# Patient Record
Sex: MALE | Race: WHITE | NOT HISPANIC OR LATINO | URBAN - METROPOLITAN AREA
[De-identification: names, ages, dates, MRNs, and addresses within clinical notes are randomized per-mention and may not be internally consistent; named-entity substitution may affect disease eponyms.]

---

## 2018-05-24 ENCOUNTER — INPATIENT (INPATIENT)
Facility: HOSPITAL | Age: 83
LOS: 3 days | Discharge: HOME | DRG: 866 | End: 2018-05-28
Attending: INTERNAL MEDICINE | Admitting: INTERNAL MEDICINE
Payer: MEDICARE

## 2018-05-24 VITALS — WEIGHT: 121.92 LBS

## 2018-05-24 DIAGNOSIS — D69.6 THROMBOCYTOPENIA, UNSPECIFIED: ICD-10-CM

## 2018-05-24 DIAGNOSIS — I48.91 UNSPECIFIED ATRIAL FIBRILLATION: ICD-10-CM

## 2018-05-24 DIAGNOSIS — R53.1 WEAKNESS: ICD-10-CM

## 2018-05-24 DIAGNOSIS — Z29.9 ENCOUNTER FOR PROPHYLACTIC MEASURES, UNSPECIFIED: ICD-10-CM

## 2018-05-24 DIAGNOSIS — I10 ESSENTIAL (PRIMARY) HYPERTENSION: ICD-10-CM

## 2018-05-24 DIAGNOSIS — N17.9 ACUTE KIDNEY FAILURE, UNSPECIFIED: ICD-10-CM

## 2018-05-24 DIAGNOSIS — E87.1 HYPO-OSMOLALITY AND HYPONATREMIA: ICD-10-CM

## 2018-05-24 DIAGNOSIS — R94.6 ABNORMAL RESULTS OF THYROID FUNCTION STUDIES: ICD-10-CM

## 2018-05-24 LAB
ALBUMIN SERPL ELPH-MCNC: 3.4 G/DL — SIGNIFICANT CHANGE UP (ref 3.3–5)
ALP SERPL-CCNC: 77 U/L — SIGNIFICANT CHANGE UP (ref 40–120)
ALT FLD-CCNC: 11 U/L — SIGNIFICANT CHANGE UP (ref 10–45)
ANION GAP SERPL CALC-SCNC: 11 MMOL/L — SIGNIFICANT CHANGE UP (ref 5–17)
APAP SERPL-MCNC: <15 UG/ML — SIGNIFICANT CHANGE UP (ref 10–30)
APPEARANCE UR: CLEAR — SIGNIFICANT CHANGE UP
APTT BLD: 29.7 SEC — SIGNIFICANT CHANGE UP (ref 27.5–37.4)
APTT BLD: 40.5 SEC — HIGH (ref 27.5–37.4)
APTT BLD: 78.8 SEC — HIGH (ref 27.5–37.4)
AST SERPL-CCNC: 19 U/L — SIGNIFICANT CHANGE UP (ref 10–40)
BASOPHILS NFR BLD AUTO: 0.3 % — SIGNIFICANT CHANGE UP (ref 0–2)
BILIRUB SERPL-MCNC: 0.5 MG/DL — SIGNIFICANT CHANGE UP (ref 0.2–1.2)
BILIRUB UR-MCNC: NEGATIVE — SIGNIFICANT CHANGE UP
BLD GP AB SCN SERPL QL: NEGATIVE — SIGNIFICANT CHANGE UP
BUN SERPL-MCNC: 26 MG/DL — HIGH (ref 7–23)
CALCIUM SERPL-MCNC: 8.7 MG/DL — SIGNIFICANT CHANGE UP (ref 8.4–10.5)
CHLORIDE SERPL-SCNC: 95 MMOL/L — LOW (ref 96–108)
CHOLEST SERPL-MCNC: 134 MG/DL — SIGNIFICANT CHANGE UP (ref 10–199)
CK MB CFR SERPL CALC: 1.3 NG/ML — SIGNIFICANT CHANGE UP (ref 0–6.7)
CK MB CFR SERPL CALC: 1.5 NG/ML — SIGNIFICANT CHANGE UP (ref 0–6.7)
CK MB CFR SERPL CALC: 1.6 NG/ML — SIGNIFICANT CHANGE UP (ref 0–6.7)
CK SERPL-CCNC: 101 U/L — SIGNIFICANT CHANGE UP (ref 30–200)
CK SERPL-CCNC: 109 U/L — SIGNIFICANT CHANGE UP (ref 30–200)
CK SERPL-CCNC: 96 U/L — SIGNIFICANT CHANGE UP (ref 30–200)
CLOSURE TME COLL+EPINEP BLD: 108 K/UL — LOW (ref 150–400)
CO2 SERPL-SCNC: 25 MMOL/L — SIGNIFICANT CHANGE UP (ref 22–31)
COLOR SPEC: YELLOW — SIGNIFICANT CHANGE UP
CREAT ?TM UR-MCNC: 17 MG/DL — SIGNIFICANT CHANGE UP
CREAT SERPL-MCNC: 1.3 MG/DL — SIGNIFICANT CHANGE UP (ref 0.5–1.3)
DIFF PNL FLD: (no result)
EOSINOPHIL NFR BLD AUTO: 0.2 % — SIGNIFICANT CHANGE UP (ref 0–6)
EOSINOPHIL NFR BLD AUTO: 1 % — SIGNIFICANT CHANGE UP (ref 0–6)
ETHANOL SERPL-MCNC: <10 MG/DL — SIGNIFICANT CHANGE UP (ref 0–10)
GLUCOSE SERPL-MCNC: 134 MG/DL — HIGH (ref 70–99)
GLUCOSE UR QL: NEGATIVE — SIGNIFICANT CHANGE UP
HAV IGM SER-ACNC: SIGNIFICANT CHANGE UP
HBA1C BLD-MCNC: 5.6 % — SIGNIFICANT CHANGE UP (ref 4–5.6)
HBV CORE IGM SER-ACNC: SIGNIFICANT CHANGE UP
HBV SURFACE AG SER-ACNC: SIGNIFICANT CHANGE UP
HCT VFR BLD CALC: 34.7 % — LOW (ref 39–50)
HCT VFR BLD CALC: 35.9 % — LOW (ref 39–50)
HCV AB S/CO SERPL IA: 0.11 S/CO — SIGNIFICANT CHANGE UP
HCV AB SERPL-IMP: SIGNIFICANT CHANGE UP
HDLC SERPL-MCNC: 45 MG/DL — SIGNIFICANT CHANGE UP (ref 40–125)
HGB BLD-MCNC: 11.6 G/DL — LOW (ref 13–17)
HGB BLD-MCNC: 11.9 G/DL — LOW (ref 13–17)
HIV 1+2 AB+HIV1 P24 AG SERPL QL IA: SIGNIFICANT CHANGE UP
HPIV3 RNA SPEC QL NAA+PROBE: DETECTED
INR BLD: 1.36 — HIGH (ref 0.88–1.16)
KETONES UR-MCNC: NEGATIVE — SIGNIFICANT CHANGE UP
LACTATE SERPL-SCNC: 1.1 MMOL/L — SIGNIFICANT CHANGE UP (ref 0.5–2)
LEUKOCYTE ESTERASE UR-ACNC: NEGATIVE — SIGNIFICANT CHANGE UP
LIDOCAIN IGE QN: 61 U/L — HIGH (ref 7–60)
LIPID PNL WITH DIRECT LDL SERPL: 75 MG/DL — SIGNIFICANT CHANGE UP
LYMPHOCYTES # BLD AUTO: 16 % — SIGNIFICANT CHANGE UP (ref 13–44)
LYMPHOCYTES # BLD AUTO: 19.7 % — SIGNIFICANT CHANGE UP (ref 13–44)
MAGNESIUM SERPL-MCNC: 1.8 MG/DL — SIGNIFICANT CHANGE UP (ref 1.6–2.6)
MANUAL DIF COMMENT BLD-IMP: SIGNIFICANT CHANGE UP
MANUAL SMEAR VERIFICATION: SIGNIFICANT CHANGE UP
MCHC RBC-ENTMCNC: 30.8 PG — SIGNIFICANT CHANGE UP (ref 27–34)
MCHC RBC-ENTMCNC: 31.7 PG — SIGNIFICANT CHANGE UP (ref 27–34)
MCHC RBC-ENTMCNC: 33.1 G/DL — SIGNIFICANT CHANGE UP (ref 32–36)
MCHC RBC-ENTMCNC: 33.4 G/DL — SIGNIFICANT CHANGE UP (ref 32–36)
MCV RBC AUTO: 93 FL — SIGNIFICANT CHANGE UP (ref 80–100)
MCV RBC AUTO: 94.8 FL — SIGNIFICANT CHANGE UP (ref 80–100)
MONOCYTES NFR BLD AUTO: 10.5 % — SIGNIFICANT CHANGE UP (ref 2–14)
MONOCYTES NFR BLD AUTO: 8 % — SIGNIFICANT CHANGE UP (ref 2–14)
NEUTROPHILS NFR BLD AUTO: 69.3 % — SIGNIFICANT CHANGE UP (ref 43–77)
NEUTROPHILS NFR BLD AUTO: 73 % — SIGNIFICANT CHANGE UP (ref 43–77)
NEUTS BAND # BLD: 2 % — SIGNIFICANT CHANGE UP
NITRITE UR-MCNC: NEGATIVE — SIGNIFICANT CHANGE UP
OSMOLALITY SERPL: 290 MOSM/KG — SIGNIFICANT CHANGE UP (ref 280–301)
OSMOLALITY UR: 230 MOSMOL/KG — SIGNIFICANT CHANGE UP (ref 100–650)
PH UR: 5.5 — SIGNIFICANT CHANGE UP (ref 5–8)
PLAT MORPH BLD: NORMAL — SIGNIFICANT CHANGE UP
PLATELET # BLD AUTO: 123 K/UL — LOW (ref 150–400)
PLATELET # BLD AUTO: 125 K/UL — LOW (ref 150–400)
POTASSIUM SERPL-MCNC: 4 MMOL/L — SIGNIFICANT CHANGE UP (ref 3.5–5.3)
POTASSIUM SERPL-SCNC: 4 MMOL/L — SIGNIFICANT CHANGE UP (ref 3.5–5.3)
PROT SERPL-MCNC: 7.4 G/DL — SIGNIFICANT CHANGE UP (ref 6–8.3)
PROT UR-MCNC: 100 MG/DL
PROTHROM AB SERPL-ACNC: 15.2 SEC — HIGH (ref 9.8–12.7)
RAPID RVP RESULT: DETECTED
RBC # BLD: 3.66 M/UL — LOW (ref 4.2–5.8)
RBC # BLD: 3.86 M/UL — LOW (ref 4.2–5.8)
RBC # FLD: 13.8 % — SIGNIFICANT CHANGE UP (ref 10.3–16.9)
RBC # FLD: 14 % — SIGNIFICANT CHANGE UP (ref 10.3–16.9)
RBC BLD AUTO: NORMAL — SIGNIFICANT CHANGE UP
RH IG SCN BLD-IMP: NEGATIVE — SIGNIFICANT CHANGE UP
SALICYLATES SERPL-MCNC: 1.1 MG/DL — LOW (ref 2.8–20)
SODIUM SERPL-SCNC: 131 MMOL/L — LOW (ref 135–145)
SODIUM UR-SCNC: 73 MMOL/L — SIGNIFICANT CHANGE UP
SP GR SPEC: 1.02 — SIGNIFICANT CHANGE UP (ref 1–1.03)
T3 SERPL-MCNC: 62 NG/DL — LOW (ref 80–200)
T4 FREE SERPL-MCNC: 0.83 NG/DL — SIGNIFICANT CHANGE UP (ref 0.7–1.48)
TOTAL CHOLESTEROL/HDL RATIO MEASUREMENT: 3 RATIO — LOW (ref 3.4–9.6)
TRIGL SERPL-MCNC: 72 MG/DL — SIGNIFICANT CHANGE UP (ref 10–149)
TROPONIN T SERPL-MCNC: 0.1 NG/ML — CRITICAL HIGH (ref 0–0.01)
TROPONIN T SERPL-MCNC: 0.11 NG/ML — CRITICAL HIGH (ref 0–0.01)
TROPONIN T SERPL-MCNC: 0.11 NG/ML — CRITICAL HIGH (ref 0–0.01)
TSH SERPL-MCNC: 6.78 UIU/ML — HIGH (ref 0.35–4.94)
TSH SERPL-MCNC: 7.04 UIU/ML — HIGH (ref 0.35–4.94)
UROBILINOGEN FLD QL: 0.2 E.U./DL — SIGNIFICANT CHANGE UP
UUN UR-MCNC: 143 MG/DL — SIGNIFICANT CHANGE UP
WBC # BLD: 5.5 K/UL — SIGNIFICANT CHANGE UP (ref 3.8–10.5)
WBC # BLD: 6.5 K/UL — SIGNIFICANT CHANGE UP (ref 3.8–10.5)
WBC # FLD AUTO: 5.5 K/UL — SIGNIFICANT CHANGE UP (ref 3.8–10.5)
WBC # FLD AUTO: 6.5 K/UL — SIGNIFICANT CHANGE UP (ref 3.8–10.5)

## 2018-05-24 PROCEDURE — 76700 US EXAM ABDOM COMPLETE: CPT | Mod: 26

## 2018-05-24 PROCEDURE — 99223 1ST HOSP IP/OBS HIGH 75: CPT

## 2018-05-24 PROCEDURE — 71045 X-RAY EXAM CHEST 1 VIEW: CPT | Mod: 26

## 2018-05-24 PROCEDURE — 72170 X-RAY EXAM OF PELVIS: CPT | Mod: 26

## 2018-05-24 PROCEDURE — 99285 EMERGENCY DEPT VISIT HI MDM: CPT | Mod: 25

## 2018-05-24 PROCEDURE — 76856 US EXAM PELVIC COMPLETE: CPT | Mod: 26

## 2018-05-24 PROCEDURE — 70450 CT HEAD/BRAIN W/O DYE: CPT | Mod: 26

## 2018-05-24 PROCEDURE — 93010 ELECTROCARDIOGRAM REPORT: CPT | Mod: 76

## 2018-05-24 PROCEDURE — 93306 TTE W/DOPPLER COMPLETE: CPT | Mod: 26

## 2018-05-24 RX ORDER — AMPICILLIN SODIUM AND SULBACTAM SODIUM 250; 125 MG/ML; MG/ML
3 INJECTION, POWDER, FOR SUSPENSION INTRAMUSCULAR; INTRAVENOUS EVERY 12 HOURS
Qty: 0 | Refills: 0 | Status: DISCONTINUED | OUTPATIENT
Start: 2018-05-24 | End: 2018-05-24

## 2018-05-24 RX ORDER — HEPARIN SODIUM 5000 [USP'U]/ML
1100 INJECTION INTRAVENOUS; SUBCUTANEOUS
Qty: 25000 | Refills: 0 | Status: DISCONTINUED | OUTPATIENT
Start: 2018-05-24 | End: 2018-05-24

## 2018-05-24 RX ORDER — FUROSEMIDE 40 MG
20 TABLET ORAL EVERY 12 HOURS
Qty: 0 | Refills: 0 | Status: DISCONTINUED | OUTPATIENT
Start: 2018-05-24 | End: 2018-05-25

## 2018-05-24 RX ORDER — DOXAZOSIN MESYLATE 4 MG
2 TABLET ORAL AT BEDTIME
Qty: 0 | Refills: 0 | Status: DISCONTINUED | OUTPATIENT
Start: 2018-05-24 | End: 2018-05-28

## 2018-05-24 RX ORDER — FUROSEMIDE 40 MG
20 TABLET ORAL EVERY 12 HOURS
Qty: 0 | Refills: 0 | Status: DISCONTINUED | OUTPATIENT
Start: 2018-05-24 | End: 2018-05-24

## 2018-05-24 RX ORDER — ACETAMINOPHEN 500 MG
650 TABLET ORAL ONCE
Qty: 0 | Refills: 0 | Status: COMPLETED | OUTPATIENT
Start: 2018-05-24 | End: 2018-05-24

## 2018-05-24 RX ORDER — FUROSEMIDE 40 MG
20 TABLET ORAL ONCE
Qty: 0 | Refills: 0 | Status: COMPLETED | OUTPATIENT
Start: 2018-05-24 | End: 2018-05-24

## 2018-05-24 RX ORDER — CEFTRIAXONE 500 MG/1
1 INJECTION, POWDER, FOR SOLUTION INTRAMUSCULAR; INTRAVENOUS ONCE
Qty: 0 | Refills: 0 | Status: COMPLETED | OUTPATIENT
Start: 2018-05-24 | End: 2018-05-24

## 2018-05-24 RX ORDER — AZITHROMYCIN 500 MG/1
500 TABLET, FILM COATED ORAL ONCE
Qty: 0 | Refills: 0 | Status: COMPLETED | OUTPATIENT
Start: 2018-05-24 | End: 2018-05-24

## 2018-05-24 RX ORDER — HEPARIN SODIUM 5000 [USP'U]/ML
1200 INJECTION INTRAVENOUS; SUBCUTANEOUS
Qty: 25000 | Refills: 0 | Status: DISCONTINUED | OUTPATIENT
Start: 2018-05-24 | End: 2018-05-25

## 2018-05-24 RX ORDER — ASPIRIN/CALCIUM CARB/MAGNESIUM 324 MG
81 TABLET ORAL DAILY
Qty: 0 | Refills: 0 | Status: DISCONTINUED | OUTPATIENT
Start: 2018-05-24 | End: 2018-05-27

## 2018-05-24 RX ORDER — HEPARIN SODIUM 5000 [USP'U]/ML
5000 INJECTION INTRAVENOUS; SUBCUTANEOUS EVERY 8 HOURS
Qty: 0 | Refills: 0 | Status: DISCONTINUED | OUTPATIENT
Start: 2018-05-24 | End: 2018-05-24

## 2018-05-24 RX ORDER — SODIUM CHLORIDE 9 MG/ML
1000 INJECTION INTRAMUSCULAR; INTRAVENOUS; SUBCUTANEOUS ONCE
Qty: 0 | Refills: 0 | Status: COMPLETED | OUTPATIENT
Start: 2018-05-24 | End: 2018-05-24

## 2018-05-24 RX ADMIN — HEPARIN SODIUM 11 UNIT(S)/HR: 5000 INJECTION INTRAVENOUS; SUBCUTANEOUS at 11:16

## 2018-05-24 RX ADMIN — AZITHROMYCIN 255 MILLIGRAM(S): 500 TABLET, FILM COATED ORAL at 03:45

## 2018-05-24 RX ADMIN — Medication 20 MILLIGRAM(S): at 11:18

## 2018-05-24 RX ADMIN — Medication 20 MILLIGRAM(S): at 05:04

## 2018-05-24 RX ADMIN — CEFTRIAXONE 100 GRAM(S): 500 INJECTION, POWDER, FOR SOLUTION INTRAMUSCULAR; INTRAVENOUS at 03:13

## 2018-05-24 RX ADMIN — HEPARIN SODIUM 12 UNIT(S)/HR: 5000 INJECTION INTRAVENOUS; SUBCUTANEOUS at 17:15

## 2018-05-24 RX ADMIN — Medication 81 MILLIGRAM(S): at 13:58

## 2018-05-24 RX ADMIN — Medication 650 MILLIGRAM(S): at 03:12

## 2018-05-24 RX ADMIN — SODIUM CHLORIDE 1000 MILLILITER(S): 9 INJECTION INTRAMUSCULAR; INTRAVENOUS; SUBCUTANEOUS at 03:13

## 2018-05-24 RX ADMIN — AMPICILLIN SODIUM AND SULBACTAM SODIUM 200 GRAM(S): 250; 125 INJECTION, POWDER, FOR SUSPENSION INTRAMUSCULAR; INTRAVENOUS at 14:37

## 2018-05-24 RX ADMIN — Medication 2 MILLIGRAM(S): at 21:58

## 2018-05-24 RX ADMIN — Medication 20 MILLIGRAM(S): at 21:58

## 2018-05-24 NOTE — H&P ADULT - PROBLEM SELECTOR PLAN 6
Patient with cr of 1.30; unclear etiology may be pre-renal in the setting of possible heart failure vs. post-renal vs. intrinsic with granular casts seen in U/A  -- check Fe urea  --Avoid nephrotoxic agents

## 2018-05-24 NOTE — H&P ADULT - ASSESSMENT
Patient is a 85 yo male with a PMHX of HTN, who was BIBEMS for generalized weakness with cough. Patient states typically he is very active and ambulates with a walker and is able to climb flight of stairs to the apartment.

## 2018-05-24 NOTE — H&P ADULT - PROBLEM SELECTOR PLAN 7
Patient with TSH of 6.9 likely euthyroid sick syndrome or subclinical hypothyroidism   -- check T4 and T3

## 2018-05-24 NOTE — ED ADULT TRIAGE NOTE - CHIEF COMPLAINT QUOTE
Per EMS "Pt complaining of generalized weakness that started 1 day ago.  Denies any other symptoms."

## 2018-05-24 NOTE — ED ADULT NURSE NOTE - CHPI ED SYMPTOMS NEG
no tingling/no vomiting/no dizziness/no fever/no pain/no nausea/no chills/no decreased eating/drinking/no numbness

## 2018-05-24 NOTE — ED PROVIDER NOTE - MEDICAL DECISION MAKING DETAILS
generalized weakness, unable to ambulate today, cough, no chest pain, no SOB  -check labs, ekg, cxr, ct head

## 2018-05-24 NOTE — H&P ADULT - PROBLEM SELECTOR PLAN 8
Patient with platelet count of 125; unclear etiology   -- continue to monitor   -- check hepatitis panel and HIV

## 2018-05-24 NOTE — PHYSICAL THERAPY INITIAL EVALUATION ADULT - SOCIAL CONCERNS
has a "lady" come assist him with meals every day. Patient reported he does not pay her and she is not his home aid. 2 flights to enter home

## 2018-05-24 NOTE — H&P ADULT - NSHPSOCIALHISTORY_GEN_ALL_CORE
lives alone; ambulates with a walker;  in the past with child; 20 pack year smoking history; no alcohol use

## 2018-05-24 NOTE — H&P ADULT - HISTORY OF PRESENT ILLNESS
In the Ed, Vitals were T(F):  Max: 100.3 HR: 54 - 62BP: 127/67 - 154/71 RR: 18 SpO2:95% - 96%. Patient was given tylenol, ceftriaxone, azithromycin and NS bolus 1L. Patient is a 85 yo male with a PMHX of HTN, who was BIBEMS for generalized weakness.  per pt and aid pt with weakness over past 2 days.  states typically he is very active and walks around and is able to climb flight of stairs to the apartment.  unable to hold himself up today and yesterday.  states today he almost fell but aid caught him.  +cough. no chest pain, no SOB, no n/v/d. no abd pain. no HA. no numbness, no focal weakness.  decreased po intake today.  aid states he recently had trouble with swallowing and had outside clinic testing, but was told it was normal.      In the Ed, Vitals were T(F):  Max: 100.3 HR: 54 - 62BP: 127/67 - 154/71 RR: 18 SpO2:95% - 96%. Patient was given tylenol, ceftriaxone, azithromycin and NS bolus 1L. Patient is a 87 yo male with a PMHX of HTN, who was BIBEMS for generalized weakness with cough. Patient states typically he is very active and ambulates with a walker and is able to climb flight of stairs to the apartment. Patient today however went to get breakfast and when he returned to use a bathroom, his legs gave out and he fell on his backside. Patient denies any syncopal episodes. Denies any lightheadedness or changes in vision prior to fall.  Patient report  non-productive cough, runny nose sore throat. Patient denies headache,  fevers, chills, SOB, abdominal pain, n/v/d, numbness changes in urinary habits.     As per chart review,  He states he recently had trouble with swallowing and had outside clinic testing, but was told it was normal.    In the Ed, Vitals were T(F):  Max: 100.3 HR: 54 - 62BP: 127/67 - 154/71 RR: 18 SpO2:95% - 96%. Patient was given tylenol, ceftriaxone, azithromycin and NS bolus 1L. Patient is a 85 yo male with a PMHX of HTN, who was BIBEMS for generalized weakness with cough. Patient states typically he is very active and ambulates with a walker and is able to climb flight of stairs to the apartment. Patient today however went to get breakfast and when he returned to use a bathroom, his legs gave out and he fell on his backside. Patient denies any syncopal episodes. Denies any lightheadedness or changes in vision prior to fall. After the fall, patient was unable to use his legs secondary to overall weakness. Otherwise,  Patient report  non-productive cough, runny nose sore throat that started 3 days ago. Patient denies headache,  fevers, chills, SOB, abdominal pain, n/v/d, numbness changes in urinary habits.     As per chart review, Eloisa also states he recently had trouble with swallowing and had outside clinic testing, but was told it was normal.    In the Ed, Vitals were T(F):  Max: 100.3 HR: 54 - 62BP: 127/67 - 154/71 RR: 18 SpO2:95% - 96%. Patient was given tylenol, ceftriaxone, lasix 20 mgx1, azithromycin and NS bolus 1L.

## 2018-05-24 NOTE — H&P ADULT - PROBLEM SELECTOR PLAN 5
Patient with sodium of 131; may be hypervolemic hypotonic hyponatremia in the setting of new onset heart failure vs. hypovolemic   -- check serum osmolality   -- check urine osmolality

## 2018-05-24 NOTE — H&P ADULT - NSHPREVIEWOFSYSTEMS_GEN_ALL_CORE
REVIEW OF SYSTEMS      General:	+ unintentional weight loss of 50 pounds in 1 year   	  Ophthalmologic: + right eye visual loss  	  ENMT:+ runny nose; + sore throat     Respiratory and Thorax: + cough   	  Cardiovascular: see above     Gastrointestinal: none     Genitourinary: no F/u/d/ hEMATURIA   	    Allergic/Immunologic: REVIEW OF SYSTEMS      General:	+ unintentional weight loss of 50 pounds in 1 year   	  Ophthalmologic: + right eye visual loss  	  ENMT:+ runny nose; + sore throat     Respiratory and Thorax: + cough   	  Cardiovascular: see above     Gastrointestinal: none     Genitourinary: no F/u/d/hematuria

## 2018-05-24 NOTE — ED PROVIDER NOTE - PROGRESS NOTE DETAILS
scattered multifocal infiltrates on CXR. will give ceft/azithro for likely PNA. will admit for continued iv hydration, ABX will admit for ABX, f/u blood cultures pt with elevated pro-bnp, no SOB.  fluids stopped (pt received ~500cc).  will give lasix.  xray may represent fluid overload.  medicine felt pt would benefit from cardiac admission and tele monitoring.  will admit to Dr. Tejeda

## 2018-05-24 NOTE — DIETITIAN INITIAL EVALUATION ADULT. - ENERGY NEEDS
Height 65"; #; #; 100%IBW  BMI 22.7  ABW used for calculations as pt between % of IBW. Needs adjusted for weight loss, wound healing, fluids per team

## 2018-05-24 NOTE — PHYSICAL THERAPY INITIAL EVALUATION ADULT - ADDITIONAL COMMENTS
ambulates with rolling walker independently and able to climb 2 flights without assist. Walker is brought up and down stairs by residents of his building

## 2018-05-24 NOTE — DIETITIAN INITIAL EVALUATION ADULT. - PROBLEM SELECTOR PLAN 2
Patient with cardiomegaly noted on CXR with cephalization; BNP elevated; lasix 20 mg x1 given; unclear if systolic or diastolic   -- trend troponins for now  -- echo in am   --monitor UOP   -- standing weights

## 2018-05-24 NOTE — H&P ADULT - NSHPLABSRESULTS_GEN_ALL_CORE
.  LABS:                         11.6   6.5   )-----------( 125      ( 24 May 2018 01:58 )             34.7         131<L>  |  95<L>  |  26<H>  ----------------------------<  134<H>  4.0   |  25  |  1.30    Ca    8.7      24 May 2018 01:57  Mg     1.8         TPro  7.4  /  Alb  3.4  /  TBili  0.5  /  DBili  x   /  AST  19  /  ALT  11  /  AlkPhos  77      PT/INR - ( 24 May 2018 01:58 )   PT: 15.2 sec;   INR: 1.36          PTT - ( 24 May 2018 01:58 )  PTT:29.7 sec  Urinalysis Basic - ( 24 May 2018 04:20 )    Color: Yellow / Appearance: Clear / S.025 / pH: x  Gluc: x / Ketone: NEGATIVE  / Bili: Negative / Urobili: 0.2 E.U./dL   Blood: x / Protein: 100 mg/dL / Nitrite: NEGATIVE   Leuk Esterase: NEGATIVE / RBC: 5-10 /HPF / WBC < 5 /HPF   Sq Epi: x / Non Sq Epi: 0-5 /HPF / Bacteria: Present /HPF      CARDIAC MARKERS ( 24 May 2018 01:57 )  x     / 0.11 ng/mL / 109 U/L / x     / 1.3 ng/mL      Serum Pro-Brain Natriuretic Peptide: 12152 pg/mL ( @ 01:57)    Lactate, Blood: 1.1 mmoL/L ( @ 01:56)      RADIOLOGY, EKG & ADDITIONAL TESTS: Reviewed. .  LABS:                         11.6   6.5   )-----------( 125      ( 24 May 2018 01:58 )             34.7         131<L>  |  95<L>  |  26<H>  ----------------------------<  134<H>  4.0   |  25  |  1.30    Ca    8.7      24 May 2018 01:57  Mg     1.8         TPro  7.4  /  Alb  3.4  /  TBili  0.5  /  DBili  x   /  AST  19  /  ALT  11  /  AlkPhos  77      PT/INR - ( 24 May 2018 01:58 )   PT: 15.2 sec;   INR: 1.36          PTT - ( 24 May 2018 01:58 )  PTT:29.7 sec  Urinalysis Basic - ( 24 May 2018 04:20 )    Color: Yellow / Appearance: Clear / S.025 / pH: x  Gluc: x / Ketone: NEGATIVE  / Bili: Negative / Urobili: 0.2 E.U./dL   Blood: x / Protein: 100 mg/dL / Nitrite: NEGATIVE   Leuk Esterase: NEGATIVE / RBC: 5-10 /HPF / WBC < 5 /HPF   Sq Epi: x / Non Sq Epi: 0-5 /HPF / Bacteria: Present /HPF      CARDIAC MARKERS ( 24 May 2018 01:57 )  x     / 0.11 ng/mL / 109 U/L / x     / 1.3 ng/mL      Serum Pro-Brain Natriuretic Peptide: 74240 pg/mL ( @ 01:57)    Lactate, Blood: 1.1 mmoL/L ( @ 01:56)      RADIOLOGY, EKG & ADDITIONAL TESTS:        CT Head No Cont (18 @ 02:24) >      IMPRESSION: No acute intracranial findings. 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 Sinus disease as above.    < end of copied text > .  LABS:                         11.6   6.5   )-----------( 125      ( 24 May 2018 01:58 )             34.7         131<L>  |  95<L>  |  26<H>  ----------------------------<  134<H>  4.0   |  25  |  1.30    Ca    8.7      24 May 2018 01:57  Mg     1.8         TPro  7.4  /  Alb  3.4  /  TBili  0.5  /  DBili  x   /  AST  19  /  ALT  11  /  AlkPhos  77      PT/INR - ( 24 May 2018 01:58 )   PT: 15.2 sec;   INR: 1.36          PTT - ( 24 May 2018 01:58 )  PTT:29.7 sec  Urinalysis Basic - ( 24 May 2018 04:20 )    Color: Yellow / Appearance: Clear / S.025 / pH: x  Gluc: x / Ketone: NEGATIVE  / Bili: Negative / Urobili: 0.2 E.U./dL   Blood: x / Protein: 100 mg/dL / Nitrite: NEGATIVE   Leuk Esterase: NEGATIVE / RBC: 5-10 /HPF / WBC < 5 /HPF   Sq Epi: x / Non Sq Epi: 0-5 /HPF / Bacteria: Present /HPF      CARDIAC MARKERS ( 24 May 2018 01:57 )  x     / 0.11 ng/mL / 109 U/L / x     / 1.3 ng/mL      Serum Pro-Brain Natriuretic Peptide: 36829 pg/mL ( @ 01:57)    Lactate, Blood: 1.1 mmoL/L ( @ 01:56)      RADIOLOGY, EKG & ADDITIONAL TESTS:        CT Head No Cont (18 @ 02:24) >      IMPRESSION: No acute intracranial findings. Findings consistent with   microvascular ischemic white matter disease. 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 Sinus disease as above.    < end of copied text >

## 2018-05-24 NOTE — PATIENT PROFILE ADULT. - VISION (WITH CORRECTIVE LENSES IF THE PATIENT USUALLY WEARS THEM):
right eye blindness/Partially impaired: cannot see medication labels or newsprint, but can see obstacles in path, and the surrounding layout; can count fingers at arm's length

## 2018-05-24 NOTE — DIETITIAN INITIAL EVALUATION ADULT. - OTHER INFO
87 yo/male with PMHx HTN admitted with weakness and cough, found to be parainfluenza positive. Pt seen in room, awake, alert, eating lunch. Noted to be slightly confused during conversation, unable to produce reliable answers to questions. Endorses fine intake at home PTA but could not list types of foods that he eats. Noted with 50# weight loss over the past year but when asked about it he couldn't explain. Currently w/minimal intake at lunch, noted with poor dentition and might benefit from mechanical soft vs. soft as he also was struggling to cut food. No N/V/C/D reported at this time. NKFA. Skin noted with sacrum stage II. No pain at this time. Encouraged PO intake.

## 2018-05-24 NOTE — H&P ADULT - PROBLEM SELECTOR PLAN 1
Patient with generalized weakness and cough s/p fall today; patient with no focal deficits; Patent Did Not meet SIRS criteria however with persistent cough and weakness and CXR concerning for infiltrates; will treat for Pneumonia now; ECG shows likely atrial fibrillation with bradycardia- unclear if new or old however patient is on plavix and ASA om medication dispensations hx.   --check RVP and procalcitonin  -- continue ceftriaxone and azithromycin for now   -- PT consult   -- obtain collateral from Dr. Pravin Harvey, [phone] regarding whether afib is new or old Patient with generalized weakness and cough s/p fall today; patient with no focal deficits; Patent Did Not meet SIRS criteria however with persistent cough and weakness and CXR concerning for infiltrates; will treat for aspiration Pneumonia now in setting of dysphagia hx ; ECG shows likely atrial fibrillation with bradycardia- unclear if new or old however patient is on plavix and ASA as per medication dispensations hx.   --check RVP and procalcitonin  -- will switch to unasyn 3 g Q12 hrs (renally dosed)  -- PT consult   -- obtain collateral from Dr. Pravin Harvey, [phone] regarding whether afib is new or old

## 2018-05-24 NOTE — DIETITIAN INITIAL EVALUATION ADULT. - PROBLEM SELECTOR PLAN 1
Patient with generalized weakness and cough s/p fall today; patient with no focal deficits; Patent Did Not meet SIRS criteria however with persistent cough and weakness and CXR concerning for infiltrates; will treat for aspiration Pneumonia now in setting of dysphagia hx ; ECG shows likely atrial fibrillation with bradycardia- unclear if new or old however patient is on plavix and ASA as per medication dispensations hx.   --check RVP and procalcitonin  -- will switch to unasyn 3 g Q12 hrs (renally dosed)  -- PT consult   -- obtain collateral from Dr. Pravin Harvey, [phone] regarding whether afib is new or old

## 2018-05-24 NOTE — ED PROVIDER NOTE - OBJECTIVE STATEMENT
86M hx htn, BIBEMS for generalized weakness.  per pt and aid pt with weakness over past 2 days.  states typically he is very active and walks around and is able to climb flight of stairs to the apartment.  unable to hold himself up today and yesterday.  states today he almost fell but aid caught him.  +cough. no chest pain, no SOB, no n/v/d. no abd pain. no HA. no numbness, no focal weakness.  decreased po intake today. 86M hx htn, BIBEMS for generalized weakness.  per pt and aid pt with weakness over past 2 days.  states typically he is very active and walks around and is able to climb flight of stairs to the apartment.  unable to hold himself up today and yesterday.  states today he almost fell but aid caught him.  +cough. no chest pain, no SOB, no n/v/d. no abd pain. no HA. no numbness, no focal weakness.  decreased po intake today.  aid states he recently had trouble with swallowing and had outside clinic testing, but was told it was normal.

## 2018-05-24 NOTE — DIETITIAN INITIAL EVALUATION ADULT. - PROBLEM SELECTOR PLAN 4
Patient with atrial fibrillation on ecg with bradycardia; JOHN- VASC is 3-4 depending on CHF hx vs. MASCORRO-BLED of 3  -- for now will hold off on anticoagulation and will discuss with attending in am as risk/benefit split  --obtain collateral from Dr. Pravin Harvey, [phone] regarding whteher afib is new or old  -- continue ASA 81MG

## 2018-05-24 NOTE — H&P ADULT - PROBLEM SELECTOR PLAN 10
F: no fluids needed  E: Replete electrolytes PRN  N: DASH with water restriction diet  Prophylactic measure: heparin SUBQ  FULL CODE

## 2018-05-24 NOTE — H&P ADULT - PROBLEM SELECTOR PLAN 4
Patient with atrial fibrillation on ecg with bradycardia; JOHN- VASC is 3-4 depending on CHF hx vs. MASCORRO-BLED of 3  -- for now will hold off on anticoagulation and will discuss with attending in am  --obtain collateral from Dr. Pravin Harvey, [phone] regarding whteher afib is new or old  -- continue ASA 81MG Patient with atrial fibrillation on ecg with bradycardia; JOHN- VASC is 3-4 depending on CHF hx vs. MASCORRO-BLED of 3  -- for now will hold off on anticoagulation and will discuss with attending in am as risk/benefit split  --obtain collateral from Dr. Pravin Harvey, [phone] regarding whteher afib is new or old  -- continue ASA 81MG

## 2018-05-24 NOTE — ED ADULT NURSE NOTE - OBJECTIVE STATEMENT
weakness on both legs, almost fell, caught by his HHA Millie, denies pain, alert O to name and Bday,  pt normally uses walker to ambulate until 2 days ago, no SOB, dizziness at this time

## 2018-05-24 NOTE — H&P ADULT - PROBLEM SELECTOR PLAN 2
Patient with cardiomegaly noted on CXR with cephalization; BNP elevated; lasix 20 mg x1 given   -- trend troponins for now  -- echo in am   --monitor UOP   -- standing weights Patient with cardiomegaly noted on CXR with cephalization; BNP elevated; lasix 20 mg x1 given; unclear if systolic or diastolic   -- trend troponins for now  -- echo in am   --monitor UOP   -- standing weights

## 2018-05-24 NOTE — H&P ADULT - NSHPPHYSICALEXAM_GEN_ALL_CORE
.  VITAL SIGNS:  T(C): 36.9 (05-24-18 @ 03:57), Max: 37.9 (05-24-18 @ 02:05)  T(F): 98.4 (05-24-18 @ 03:57), Max: 100.3 (05-24-18 @ 02:05)  HR: 56 (05-24-18 @ 03:57) (54 - 62)  BP: 127/67 (05-24-18 @ 03:57) (127/67 - 154/71)  BP(mean): --  RR: 18 (05-24-18 @ 02:05) (18 - 18)  SpO2: 96% (05-24-18 @ 02:05) (95% - 96%)  Wt(kg): --    PHYSICAL EXAM:    Constitutional: WDWN resting comfortably in bed; NAD  Head: NC/AT  Eyes: + right eye visual loss; PERRL, EOMI, clear conjunctiva  ENT: + adentulous; no nasal discharge; uvula midline, no oropharyngeal erythema or exudates; MMM  Neck: supple; no JVD   Respiratory: crackles appreciated in the lower bases B/L  Cardiac: +systolic heart  murmur at RSB; RRR; no M/R/G;  Gastrointestinal: soft, NT/ND; no rebound or guarding; +BS  Back: spine midline, no bony tenderness or step-offs; no CVAT B/L  Extremities: WWP, no clubbing or cyanosis; no peripheral edema  Musculoskeletal: NROM x4; 5/5 muscle strength in the LE's B/L  Vascular: 2+ radial pulses B/L  Dermatologic: skin warm, dry and intact; no rashes, wounds, or scars  Lymphatic: no submandibular or cervical LAD  Neurologic: AAOx1 to name ; CNII-XII grossly intact; no focal deficits  Psychiatric: affect and characteristics of appearance, verbalizations, behaviors are appropriate

## 2018-05-25 DIAGNOSIS — I50.9 HEART FAILURE, UNSPECIFIED: ICD-10-CM

## 2018-05-25 DIAGNOSIS — B33.8 OTHER SPECIFIED VIRAL DISEASES: ICD-10-CM

## 2018-05-25 LAB
ANION GAP SERPL CALC-SCNC: 11 MMOL/L — SIGNIFICANT CHANGE UP (ref 5–17)
APTT BLD: 82.2 SEC — HIGH (ref 27.5–37.4)
APTT BLD: 99.1 SEC — HIGH (ref 27.5–37.4)
BUN SERPL-MCNC: 26 MG/DL — HIGH (ref 7–23)
CALCIUM SERPL-MCNC: 8.9 MG/DL — SIGNIFICANT CHANGE UP (ref 8.4–10.5)
CHLORIDE SERPL-SCNC: 97 MMOL/L — SIGNIFICANT CHANGE UP (ref 96–108)
CO2 SERPL-SCNC: 29 MMOL/L — SIGNIFICANT CHANGE UP (ref 22–31)
CREAT SERPL-MCNC: 1.19 MG/DL — SIGNIFICANT CHANGE UP (ref 0.5–1.3)
CULTURE RESULTS: NO GROWTH — SIGNIFICANT CHANGE UP
GLUCOSE SERPL-MCNC: 107 MG/DL — HIGH (ref 70–99)
HCT VFR BLD CALC: 37.9 % — LOW (ref 39–50)
HGB BLD-MCNC: 12.4 G/DL — LOW (ref 13–17)
MAGNESIUM SERPL-MCNC: 1.8 MG/DL — SIGNIFICANT CHANGE UP (ref 1.6–2.6)
MCHC RBC-ENTMCNC: 30.8 PG — SIGNIFICANT CHANGE UP (ref 27–34)
MCHC RBC-ENTMCNC: 32.7 G/DL — SIGNIFICANT CHANGE UP (ref 32–36)
MCV RBC AUTO: 94 FL — SIGNIFICANT CHANGE UP (ref 80–100)
PLATELET # BLD AUTO: 132 K/UL — LOW (ref 150–400)
POTASSIUM SERPL-MCNC: 3.5 MMOL/L — SIGNIFICANT CHANGE UP (ref 3.5–5.3)
POTASSIUM SERPL-SCNC: 3.5 MMOL/L — SIGNIFICANT CHANGE UP (ref 3.5–5.3)
RBC # BLD: 4.03 M/UL — LOW (ref 4.2–5.8)
RBC # FLD: 13.6 % — SIGNIFICANT CHANGE UP (ref 10.3–16.9)
SODIUM SERPL-SCNC: 137 MMOL/L — SIGNIFICANT CHANGE UP (ref 135–145)
SPECIMEN SOURCE: SIGNIFICANT CHANGE UP
WBC # BLD: 5.7 K/UL — SIGNIFICANT CHANGE UP (ref 3.8–10.5)
WBC # FLD AUTO: 5.7 K/UL — SIGNIFICANT CHANGE UP (ref 3.8–10.5)

## 2018-05-25 PROCEDURE — 99233 SBSQ HOSP IP/OBS HIGH 50: CPT

## 2018-05-25 PROCEDURE — 71045 X-RAY EXAM CHEST 1 VIEW: CPT | Mod: 26

## 2018-05-25 PROCEDURE — 71250 CT THORAX DX C-: CPT | Mod: 26

## 2018-05-25 RX ORDER — APIXABAN 2.5 MG/1
5 TABLET, FILM COATED ORAL EVERY 12 HOURS
Qty: 0 | Refills: 0 | Status: DISCONTINUED | OUTPATIENT
Start: 2018-05-25 | End: 2018-05-25

## 2018-05-25 RX ORDER — FUROSEMIDE 40 MG
20 TABLET ORAL
Qty: 0 | Refills: 0 | Status: DISCONTINUED | OUTPATIENT
Start: 2018-05-25 | End: 2018-05-28

## 2018-05-25 RX ORDER — POTASSIUM CHLORIDE 20 MEQ
40 PACKET (EA) ORAL ONCE
Qty: 0 | Refills: 0 | Status: COMPLETED | OUTPATIENT
Start: 2018-05-25 | End: 2018-05-25

## 2018-05-25 RX ORDER — APIXABAN 2.5 MG/1
2.5 TABLET, FILM COATED ORAL EVERY 12 HOURS
Qty: 0 | Refills: 0 | Status: DISCONTINUED | OUTPATIENT
Start: 2018-05-25 | End: 2018-05-28

## 2018-05-25 RX ORDER — HEPARIN SODIUM 5000 [USP'U]/ML
1100 INJECTION INTRAVENOUS; SUBCUTANEOUS
Qty: 25000 | Refills: 0 | Status: DISCONTINUED | OUTPATIENT
Start: 2018-05-25 | End: 2018-05-25

## 2018-05-25 RX ORDER — HEPARIN SODIUM 5000 [USP'U]/ML
1050 INJECTION INTRAVENOUS; SUBCUTANEOUS
Qty: 25000 | Refills: 0 | Status: DISCONTINUED | OUTPATIENT
Start: 2018-05-25 | End: 2018-05-25

## 2018-05-25 RX ORDER — POTASSIUM CHLORIDE 20 MEQ
20 PACKET (EA) ORAL ONCE
Qty: 0 | Refills: 0 | Status: COMPLETED | OUTPATIENT
Start: 2018-05-25 | End: 2018-05-25

## 2018-05-25 RX ADMIN — APIXABAN 2.5 MILLIGRAM(S): 2.5 TABLET, FILM COATED ORAL at 18:28

## 2018-05-25 RX ADMIN — APIXABAN 2.5 MILLIGRAM(S): 2.5 TABLET, FILM COATED ORAL at 12:23

## 2018-05-25 RX ADMIN — Medication 20 MILLIEQUIVALENT(S): at 16:17

## 2018-05-25 RX ADMIN — Medication 20 MILLIGRAM(S): at 18:27

## 2018-05-25 RX ADMIN — HEPARIN SODIUM 11 UNIT(S)/HR: 5000 INJECTION INTRAVENOUS; SUBCUTANEOUS at 03:25

## 2018-05-25 RX ADMIN — Medication 2 MILLIGRAM(S): at 22:10

## 2018-05-25 RX ADMIN — Medication 40 MILLIEQUIVALENT(S): at 10:39

## 2018-05-25 RX ADMIN — Medication 81 MILLIGRAM(S): at 10:39

## 2018-05-25 RX ADMIN — Medication 20 MILLIGRAM(S): at 11:22

## 2018-05-25 NOTE — PROGRESS NOTE ADULT - PROBLEM SELECTOR PLAN 7
Patient with TSH of 6.9 likely euthyroid sick syndrome or subclinical hypothyroidism   -- check T4 and T3 F: no fluids needed  E: Replete electrolytes PRN  N: DASH with water restriction diet  Prophylactic measure: Eliquis 2.5mg BID  FULL CODE

## 2018-05-25 NOTE — CONSULT NOTE ADULT - SUBJECTIVE AND OBJECTIVE BOX
CHIEF COMPLAINT: weakness     HISTORY OF PRESENT ILLNESS: 85 yo M with history of HTN, BPH was brought to ED for evaluation of generalized weakness, cough. His ECG showed atrial fibrillation with slow ventricular response. Patient denies any chest pain, SOB, palpitations, syncope, dizziness.    PAST MEDICAL & SURGICAL HISTORY:  BPH (benign prostatic hyperplasia)  HTN (hypertension)  No significant past surgical history        PERTINENT DIAGNOSTIC TESTING:    [ ] Echocardiogram: < from: Echocardiogram (05.24.18 @ 13:20) >  There is mild asymmetric septal ventricular hypertrophy.The left   ventricular   wall motion is normal.The left ventricular ejection fraction is   estimated to   be 60-65%The left atrium is dilated.The mitral inflow pattern is   consistent   with restrictive left ventricular filling, suggestive of severely   elevated   left atrial pressure (>20mmHg).  The right ventricle is normal in size   and   function.Structurally normal aortic valve.There is trace to mild aortic   regurgitation.Structurally normal mitral valve.The right atrium is   dilated.Structurally normal tricuspid valve.There is trace to mild   tricuspid   regurgitation.There is no echocardiographic evidence for pulmonary   hypertension.Structurally normal pulmonic valve.There is no pericardial   effusion.The inferior vena cava is normal in size (<2.1 cm) with normal   inspiratory collapse (>50%) consistent with normal right atrial   pressure.  No   aortic root dilatation.      Allergies    No Known Allergies    Intolerances    	    MEDICATIONS:  doxazosin 2 milliGRAM(s) Oral at bedtime  furosemide    Tablet 20 milliGRAM(s) Oral two times a day  apixaban 2.5 milliGRAM(s) Oral every 12 hours  aspirin enteric coated 81 milliGRAM(s) Oral daily  potassium chloride   Powder 20 milliEquivalent(s) Oral once      FAMILY HISTORY:  No pertinent family history in first degree relatives      SOCIAL HISTORY:    [x ] Non-smoker      REVIEW OF SYSTEMS:    CONSTITUTIONAL: No fever, weight loss, or fatigue  EYES: No eye pain, visual disturbances, or discharge  ENMT:  No difficulty hearing, tinnitus, vertigo; No sinus or throat pain  NECK: No pain or stiffness  RESPIRATORY: No cough, wheezing, chills or hemoptysis; No Shortness of Breath  CARDIOVASCULAR: No chest pain, palpitations, dizziness, or leg swelling  GASTROINTESTINAL: No abdominal or epigastric pain. No nausea, vomiting, or hematemesis; No diarrhea or constipation. No melena or hematochezia.  GENITOURINARY: No dysuria, frequency, hematuria, or incontinence  NEUROLOGICAL: No headaches, memory loss, loss of strength, numbness, or tremors  SKIN: No itching, burning, rashes, or lesions   LYMPH Nodes: No enlarged glands  ENDOCRINE: No heat or cold intolerance; No hair loss  MUSCULOSKELETAL: No joint pain or swelling; No muscle, back, or extremity pain  PSYCHIATRIC: No depression, anxiety, mood swings, or difficulty sleeping    PHYSICAL EXAM:  T(C): 36.8 (05-25-18 @ 05:42), Max: 37.1 (05-24-18 @ 18:13)  HR: 62 (05-25-18 @ 08:31) (46 - 64)  BP: 142/63 (05-25-18 @ 08:31) (117/61 - 189/71)  RR: 16 (05-25-18 @ 08:31) (16 - 18)  SpO2: 96% (05-25-18 @ 08:31) (94% - 97%)  Wt(kg): --  I&O's Summary    24 May 2018 07:01  -  25 May 2018 07:00  --------------------------------------------------------  IN: 428 mL / OUT: 2700 mL / NET: -2272 mL        TELEMETRY:  atrial fibrillation   ECG:  < from: 12 Lead ECG (05.24.18 @ 01:59) >  Ventricular Rate 56 BPM    Atrial Rate 61 BPM    QRS Duration 82 ms    Q-T Interval 458 ms    QTC Calculation(Bezet) 441 ms    R Axis -53 degrees    T Axis 66 degrees    Diagnosis Line Atrial fibrillation with slow ventricular response  Left anterior fascicular block      HEENT:   PERRL, EOMI	  Cardiovascular:  S1 S2, irregular,  edema  Respiratory: Lungs clear to auscultation	  Gastrointestinal:  Soft, Non-tender, + BS	  Neurologic: A&O x 3  Extremities: no edema    	  LABS:	 	    CARDIAC MARKERS:                          12.4   5.7   )-----------( 132      ( 25 May 2018 07:48 )             37.9     05-25    137  |  97  |  26<H>  ----------------------------<  107<H>  3.5   |  29  |  1.19    Ca    8.9      25 May 2018 07:48  Mg     1.8     05-25    TPro  7.4  /  Alb  3.4  /  TBili  0.5  /  DBili  x   /  AST  19  /  ALT  11  /  AlkPhos  77  05-24    proBNP:   Lipid Profile:   HgA1c:   TSH:     ASSESSMENT/PLAN: 	  85 yo M with history of HTN, BPH was brought to ED for evaluation of generalized weakness, cough and newly diagnosed with  atrial fibrillation with slow ventricular response. Patient is currently on  isolation for influenza and work up for swallowing difficulty.  Would continue on Eliquis, he is asymptomatic, will reevaluate need for  DCCV before discharge.

## 2018-05-25 NOTE — PROGRESS NOTE ADULT - PROBLEM SELECTOR PLAN 1
Patient with generalized weakness and cough s/p fall today; patient with no focal deficits; Patent Did Not meet SIRS criteria however with persistent cough and weakness and CXR concerning for infiltrates; will treat for aspiration Pneumonia now in setting of dysphagia hx ; ECG shows likely atrial fibrillation with bradycardia- unclear if new or old however patient is on plavix and ASA as per medication dispensations hx.   --check RVP and procalcitonin  -- will switch to unasyn 3 g Q12 hrs (renally dosed)  -- PT consult   -- obtain collateral from Dr. Pravin Harvey, [phone] regarding whether afib is new or old - RVP (+)   - C/w Supportive Care   - Lung exam still reveals rales, worry raised about possible ILD  - To f/u non/con CT Chest

## 2018-05-25 NOTE — PROGRESS NOTE ADULT - PROBLEM SELECTOR PLAN 5
Patient with sodium of 131; may be hypervolemic hypotonic hyponatremia in the setting of new onset heart failure vs. hypovolemic   -- check serum osmolality   -- check urine osmolality Patient with platelet count of 125; unclear etiology   - HIV/Hepatitis Panel Normal   - U/S Abdomen - No focal pathology in liver and spleen

## 2018-05-25 NOTE — PROGRESS NOTE ADULT - SUBJECTIVE AND OBJECTIVE BOX
INTERVAL HPI/OVERNIGHT EVENTS:      On further ROS, patient did not have complaint of: Headache, Blurred Vision, Cough, Dyspnea, Palpitations, Abdominal Pain, N/V, Weakness, Change in Sensation.     VITAL SIGNS:  T(F): 98.3 (18 @ 05:42)  HR: 72 (18 @ 12:28)  BP: 125/54 (18 @ 12:28)  RR: 16 (18 @ 08:31)  SpO2: 88% (18 @ 12:28)  Wt(kg): --    Input & Output:  18 @ 07:01  -  18 @ 07:00  --------------------------------------------------------  IN: 428 mL / OUT: 2700 mL / NET: -2272 mL    PHYSICAL EXAM:  Constitutional: Patient seated comfortably in bed, of appropriate color, nutrition, and hydration.   HEENT: PERRLA, Normal Range of eye movement with no complaint of diplopia, Normal Hearing  Neck: No LAD, No JVD  Back: Normal spine flexure, No CVA tenderness  Respiratory: Normal air entry, Lungs clear to auscultation b/l w/o wheeze or crepitations.   Cardiovascular: S1 and S2 present - no additional abnormal sounds or murmurs. Normal rhythm and rate of pulse.   Gastrointestinal: BS+, soft, NT/ND  Extremities: No peripheral edema  Vascular: 2+ peripheral pulses  Neurological: A/O x 3, CN V-XII grossly intact.  Psychiatric: Normal mood, normal affect  Musculoskeletal: 5/5 strength b/l upper and lower extremities  Skin: No rashes    MEDICATIONS  (STANDING):  apixaban 2.5 milliGRAM(s) Oral every 12 hours  aspirin enteric coated 81 milliGRAM(s) Oral daily  doxazosin 2 milliGRAM(s) Oral at bedtime  furosemide    Tablet 20 milliGRAM(s) Oral two times a day  potassium chloride   Powder 20 milliEquivalent(s) Oral once      Allergies    No Known Allergies    Intolerances      LABS:                        12.4   5.7   )-----------( 132      ( 25 May 2018 07:48 )             37.9         137  |  97  |  26<H>  ----------------------------<  107<H>  3.5   |  29  |  1.19    Ca    8.9      25 May 2018 07:48  Mg     1.8         TPro  7.4  /  Alb  3.4  /  TBili  0.5  /  DBili  x   /  AST  19  /  ALT  11  /  AlkPhos  77  24    PT/INR - ( 24 May 2018 01:58 )   PT: 15.2 sec;   INR: 1.36          PTT - ( 25 May 2018 07:48 )  PTT:82.2 sec  Urinalysis Basic - ( 24 May 2018 04:20 )    Color: Yellow / Appearance: Clear / S.025 / pH: x  Gluc: x / Ketone: NEGATIVE  / Bili: Negative / Urobili: 0.2 E.U./dL   Blood: x / Protein: 100 mg/dL / Nitrite: NEGATIVE   Leuk Esterase: NEGATIVE / RBC: 5-10 /HPF / WBC < 5 /HPF   Sq Epi: x / Non Sq Epi: 0-5 /HPF / Bacteria: Present /HPF        RADIOLOGY & ADDITIONAL TESTS: INTERVAL HPI/OVERNIGHT EVENTS:  No overnight events. Patient's heparin drip managed appropriately, which was initiated in the setting of new onset atrial fibrillation. Patient was seen by EP - no plan for D/C at this time as patient asymptomatic with a fib/slow ventricular response. Patient started on Eliquis 2.5mg BID.     On further ROS, patient did not have complaint of: Headache, Blurred Vision, Cough, Dyspnea, Palpitations, Abdominal Pain, N/V, Weakness, Change in Sensation.     VITAL SIGNS:  T(F): 98.3 (18 @ 05:42)  HR: 72 (18 @ 12:28)  BP: 125/54 (18 @ 12:28)  RR: 16 (18 @ 08:31)  SpO2: 88% (18 @ 12:28)  Wt(kg): --    Input & Output:  18 @ 07:01  -  18 @ 07:00  --------------------------------------------------------  IN: 428 mL / OUT: 2700 mL / NET: -2272 mL    PHYSICAL EXAM:  Constitutional: Patient seated comfortably in bed, of appropriate color, nutrition, and hydration.   HEENT: PERRLA, Normal Range of eye movement with no complaint of diplopia, Normal Hearing  Neck: No LAD, No JVD  Back: Normal spine flexure, No CVA tenderness  Respiratory: Normal air entry, b/l rales auscultated on exam.   Cardiovascular: S1 and S2 present - no additional abnormal sounds or murmurs. Normal rhythm and rate of pulse.   Gastrointestinal: BS+, soft, NT/ND  Extremities: No peripheral edema  Vascular: 2+ peripheral pulses  Neurological: A/O x 3, CN V-XII grossly intact.  Psychiatric: Normal mood, normal affect  Musculoskeletal: 5/5 strength b/l upper and lower extremities  Skin: No rashes    MEDICATIONS  (STANDING):  apixaban 2.5 milliGRAM(s) Oral every 12 hours  aspirin enteric coated 81 milliGRAM(s) Oral daily  doxazosin 2 milliGRAM(s) Oral at bedtime  furosemide    Tablet 20 milliGRAM(s) Oral two times a day  potassium chloride   Powder 20 milliEquivalent(s) Oral once      Allergies    No Known Allergies    Intolerances      LABS:                        12.4   5.7   )-----------( 132      ( 25 May 2018 07:48 )             37.9         137  |  97  |  26<H>  ----------------------------<  107<H>  3.5   |  29  |  1.19    Ca    8.9      25 May 2018 07:48  Mg     1.8         TPro  7.4  /  Alb  3.4  /  TBili  0.5  /  DBili  x   /  AST  19  /  ALT  11  /  AlkPhos  77      PT/INR - ( 24 May 2018 01:58 )   PT: 15.2 sec;   INR: 1.36          PTT - ( 25 May 2018 07:48 )  PTT:82.2 sec  Urinalysis Basic - ( 24 May 2018 04:20 )    Color: Yellow / Appearance: Clear / S.025 / pH: x  Gluc: x / Ketone: NEGATIVE  / Bili: Negative / Urobili: 0.2 E.U./dL   Blood: x / Protein: 100 mg/dL / Nitrite: NEGATIVE   Leuk Esterase: NEGATIVE / RBC: 5-10 /HPF / WBC < 5 /HPF   Sq Epi: x / Non Sq Epi: 0-5 /HPF / Bacteria: Present /HPF        RADIOLOGY & ADDITIONAL TESTS:  Pending non/con CT Chest

## 2018-05-25 NOTE — PROGRESS NOTE ADULT - PROBLEM SELECTOR PLAN 4
Patient with atrial fibrillation on ecg with bradycardia; JOHN- VASC is 3-4 depending on CHF hx vs. MASCORRO-BLED of 3  -- for now will hold off on anticoagulation and will discuss with attending in am as risk/benefit split  --obtain collateral from Dr. Pravin Harvey, [phone] regarding whteher afib is new or old  -- continue ASA 81MG Patient with TSH of 6.9   - Normal T4, Low T3   - Euthyroid Sick Syndrome

## 2018-05-25 NOTE — PROGRESS NOTE ADULT - PROBLEM SELECTOR PLAN 3
see plan above under weakness - ECHO with preserved EF, Diastolic CHF   - Strict I&O, Daily Weights   - C/w Lasix PO 20mg BID

## 2018-05-25 NOTE — PROGRESS NOTE ADULT - PROBLEM SELECTOR PLAN 6
Patient with cr of 1.30; unclear etiology may be pre-renal in the setting of possible heart failure vs. post-renal vs. intrinsic with granular casts seen in U/A  -- check Fe urea  --Avoid nephrotoxic agents Patient with reported HTN and takes cardura   -- c/w cardura 2 mg QD

## 2018-05-25 NOTE — PROGRESS NOTE ADULT - PROBLEM SELECTOR PLAN 2
Patient with cardiomegaly noted on CXR with cephalization; BNP elevated; lasix 20 mg x1 given; unclear if systolic or diastolic   -- trend troponins for now  -- echo in am   --monitor UOP   -- standing weights Patient with atrial fibrillation, slow ventricular response   - Per Collateral from PCP, this is new onset   - CHADSVASC 4   - Transitioned to Eliquis 2.5mg BID from Heparin Drip   - No plan for Cardioversion at this time per EP

## 2018-05-26 PROCEDURE — 99233 SBSQ HOSP IP/OBS HIGH 50: CPT

## 2018-05-26 RX ORDER — ASPIRIN/CALCIUM CARB/MAGNESIUM 324 MG
1 TABLET ORAL
Qty: 0 | Refills: 0 | COMMUNITY
Start: 2018-05-26

## 2018-05-26 RX ORDER — FUROSEMIDE 40 MG
1 TABLET ORAL
Qty: 60 | Refills: 0 | OUTPATIENT
Start: 2018-05-26 | End: 2018-06-24

## 2018-05-26 RX ORDER — APIXABAN 2.5 MG/1
1 TABLET, FILM COATED ORAL
Qty: 60 | Refills: 0 | OUTPATIENT
Start: 2018-05-26 | End: 2018-06-24

## 2018-05-26 RX ORDER — DOXAZOSIN MESYLATE 4 MG
1 TABLET ORAL
Qty: 0 | Refills: 0 | COMMUNITY
Start: 2018-05-26

## 2018-05-26 RX ADMIN — Medication 81 MILLIGRAM(S): at 10:37

## 2018-05-26 RX ADMIN — Medication 20 MILLIGRAM(S): at 18:34

## 2018-05-26 RX ADMIN — Medication 20 MILLIGRAM(S): at 06:34

## 2018-05-26 RX ADMIN — APIXABAN 2.5 MILLIGRAM(S): 2.5 TABLET, FILM COATED ORAL at 18:34

## 2018-05-26 RX ADMIN — Medication 2 MILLIGRAM(S): at 21:12

## 2018-05-26 RX ADMIN — APIXABAN 2.5 MILLIGRAM(S): 2.5 TABLET, FILM COATED ORAL at 06:34

## 2018-05-26 NOTE — DISCHARGE NOTE ADULT - HOSPITAL COURSE
Patient is a 87 yo male with a PMHX of HTN, who was BIBEMS for generalized weakness with cough. Patient states typically he is very active and ambulates with a walker and is able to climb flight of stairs to the apartment. Patient was found in Afib with slow ventricular response and was started on Eliquis. Patient was seen by physical therapy, recommended [facility]. Medically ready for discharge. Patient is a 85 yo male with a PMHX of HTN, who was BIBEMS for generalized weakness with cough. Patient states typically he is very active and ambulates with a walker and is able to climb flight of stairs to the apartment. Patient was found in Afib with slow ventricular response and was started on Eliquis. He also found to have Parainfluenza Infection, which likely accounted for the weakness. He was given supportive care, and he improved appropriately. Patient was seen by physical therapy, recommended [facility]. Medically ready for discharge.

## 2018-05-26 NOTE — DISCHARGE NOTE ADULT - MEDICATION SUMMARY - MEDICATIONS TO TAKE
I will START or STAY ON the medications listed below when I get home from the hospital:    doxazosin 2 mg oral tablet  -- 1 tab(s) by mouth once a day (at bedtime)  -- Indication: For BPH (benign prostatic hyperplasia) I will START or STAY ON the medications listed below when I get home from the hospital:    doxazosin 2 mg oral tablet  -- 1 tab(s) by mouth once a day (at bedtime)  -- Indication: For BPH (benign prostatic hyperplasia)    apixaban 2.5 mg oral tablet  -- 1 tab(s) by mouth every 12 hours  -- Indication: For Atrial fibrillation    furosemide 20 mg oral tablet  -- 1 tab(s) by mouth 2 times a day  -- Indication: For Heart failure

## 2018-05-26 NOTE — DISCHARGE NOTE ADULT - VISION (WITH CORRECTIVE LENSES IF THE PATIENT USUALLY WEARS THEM):
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

## 2018-05-26 NOTE — DISCHARGE NOTE ADULT - PLAN OF CARE
Continuation of your blood thinner medications, Eliquis 2.5mg twice daily, and prompt follow up with your outpatient provider, Dr Pravin Harvey. Atrial fibrillation is an irregular and often rapid heart rate that can increase your risk of stroke, heart failure and other heart-related complications. During atrial fibrillation, the heart's two upper chambers (the atria) beat chaotically and irregularly — out of coordination with the two lower chambers (the ventricles) of the heart. Atrial fibrillation symptoms often include heart palpitations, shortness of breath and weakness. Episodes of atrial fibrillation can come and go, or you may develop atrial fibrillation that doesn't go away and may require treatment. Although atrial fibrillation itself usually isn't life-threatening, it is a serious medical condition that sometimes requires emergency treatment.  It may lead to complications. Atrial fibrillation can lead to blood clots forming in the heart that may circulate to other organs and lead to blocked blood flow (ischemia).  Treatments for atrial fibrillation may include medications and other interventions to try to alter the heart's electrical system. Your heart rate is actually slow and controlled without medication, as a result, the only medication required will be a blood thinner - Eliquis 2.5mg twice daily. You are to continue this and follow up with your outpatient provider Dr Pravin Harvey as well as Electrophysiology, Dr Rosales. Resolution of symptoms and recuperation from your viral illness. You were found to have parainfluenza infection - a viral infection that likely led to your weakness, particularly muscle weakness, and symptoms of cough and mild shortness of breath. The treatment is merely supportive, with Tylenol for fevers, inhaled treatments to improve breathing, and hydration. Given persistent wheezing with breathing, there was concern for possible lung disease that has not yet been diagnosed. You underwent a CT Scan of the Chest - it showed: ______________________________. You should follow up with Dr Pravin Harvey at your earliest convenience for further mangement. Please continue Cardura 2mg once a day as prescribed. You were found to have parainfluenza infection - a viral infection that likely led to your weakness, particularly muscle weakness, and symptoms of cough and mild shortness of breath. The treatment is merely supportive, with Tylenol for fevers, inhaled treatments to improve breathing, and hydration. Given persistent wheezing with breathing, there was concern for possible lung disease that has not yet been diagnosed. 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 You should follow up with Dr Pravin Harvey at your earliest convenience for further mangement and have him consider referral to a Pulmonologist.

## 2018-05-26 NOTE — PROGRESS NOTE ADULT - ATTENDING COMMENTS
Covering for Dr. Tejeda. Pt seen this am. No complaints. Remains off oxygen. VSS. Labs unremarkable. Exam unremarkable. Pt s/p noncontrast CT yesterday. Await final results to assess for ILD. Pt appears medically stable for discharge; however, await ongoing PT assessment. PT must see patient today for further input.

## 2018-05-26 NOTE — PROGRESS NOTE ADULT - PROBLEM SELECTOR PLAN 7
F: no fluids needed  E: Replete electrolytes PRN  N: DASH with water restriction diet  Prophylactic measure: Eliquis 2.5mg BID  FULL CODE

## 2018-05-26 NOTE — DISCHARGE NOTE ADULT - OTHER SIGNIFICANT FINDINGS
< from: CT Chest No Cont (05.25.18 @ 19:20) >  FINDINGS:  Lungs: Unremarkable.  Pleural space: Extensive scattered pulmonary fibrosis, honeycombing,   bronchial wall thickening, and  chronic interstitial changes. No discrete focal consolidation. No pleural   effusion. No pneumothorax.  Heart: Unremarkable.  Bones/joints: Unremarkable. No acute fracture. No dislocation.  Soft tissues: Unremarkable.  Vasculature: Unremarkable. No thoracic aortic aneurysm.  Lymph nodes: Unremarkable.      IMPRESSION:  Chronic changes as above. No focal consolidation, pleural effusion, or   pneumothorax.

## 2018-05-26 NOTE — DISCHARGE NOTE ADULT - PATIENT PORTAL LINK FT
You can access the [facility] Patient Portal, offered by [facility], by registering with the following website: [email or web address]

## 2018-05-26 NOTE — DISCHARGE NOTE ADULT - CARE PLAN
Principal Discharge DX:	Atrial fibrillation  Goal:	Continuation of your blood thinner medications, Eliquis 2.5mg twice daily, and prompt follow up with your outpatient provider, Dr Pravin Harvey.  Assessment and plan of treatment:	Atrial fibrillation is an irregular and often rapid heart rate that can increase your risk of stroke, heart failure and other heart-related complications. During atrial fibrillation, the heart's two upper chambers (the atria) beat chaotically and irregularly — out of coordination with the two lower chambers (the ventricles) of the heart. Atrial fibrillation symptoms often include heart palpitations, shortness of breath and weakness. Episodes of atrial fibrillation can come and go, or you may develop atrial fibrillation that doesn't go away and may require treatment. Although atrial fibrillation itself usually isn't life-threatening, it is a serious medical condition that sometimes requires emergency treatment.  It may lead to complications. Atrial fibrillation can lead to blood clots forming in the heart that may circulate to other organs and lead to blocked blood flow (ischemia).  Treatments for atrial fibrillation may include medications and other interventions to try to alter the heart's electrical system. Your heart rate is actually slow and controlled without medication, as a result, the only medication required will be a blood thinner - Eliquis 2.5mg twice daily. You are to continue this and follow up with your outpatient provider Dr Pravin Harvey as well as Electrophysiology, Dr Rosales.  Secondary Diagnosis:	Parainfluenza  Goal:	Resolution of symptoms and recuperation from your viral illness.  Assessment and plan of treatment:	You were found to have parainfluenza infection - a viral infection that likely led to your weakness, particularly muscle weakness, and symptoms of cough and mild shortness of breath. The treatment is merely supportive, with Tylenol for fevers, inhaled treatments to improve breathing, and hydration. Given persistent wheezing with breathing, there was concern for possible lung disease that has not yet been diagnosed. You underwent a CT Scan of the Chest - it showed: ______________________________. You should follow up with Dr Pravin Harvey at your earliest convenience for further mangement.  Secondary Diagnosis:	BPH (benign prostatic hyperplasia)  Assessment and plan of treatment:	Please continue Cardura 2mg once a day as prescribed.  Secondary Diagnosis:	Diastolic heart failure Principal Discharge DX:	Atrial fibrillation  Goal:	Continuation of your blood thinner medications, Eliquis 2.5mg twice daily, and prompt follow up with your outpatient provider, Dr Pravin Harvey.  Assessment and plan of treatment:	Atrial fibrillation is an irregular and often rapid heart rate that can increase your risk of stroke, heart failure and other heart-related complications. During atrial fibrillation, the heart's two upper chambers (the atria) beat chaotically and irregularly — out of coordination with the two lower chambers (the ventricles) of the heart. Atrial fibrillation symptoms often include heart palpitations, shortness of breath and weakness. Episodes of atrial fibrillation can come and go, or you may develop atrial fibrillation that doesn't go away and may require treatment. Although atrial fibrillation itself usually isn't life-threatening, it is a serious medical condition that sometimes requires emergency treatment.  It may lead to complications. Atrial fibrillation can lead to blood clots forming in the heart that may circulate to other organs and lead to blocked blood flow (ischemia).  Treatments for atrial fibrillation may include medications and other interventions to try to alter the heart's electrical system. Your heart rate is actually slow and controlled without medication, as a result, the only medication required will be a blood thinner - Eliquis 2.5mg twice daily. You are to continue this and follow up with your outpatient provider Dr Pravin Harvey as well as Electrophysiology, Dr Rosales.  Secondary Diagnosis:	Parainfluenza  Goal:	Resolution of symptoms and recuperation from your viral illness.  Assessment and plan of treatment:	You were found to have parainfluenza infection - a viral infection that likely led to your weakness, particularly muscle weakness, and symptoms of cough and mild shortness of breath. The treatment is merely supportive, with Tylenol for fevers, inhaled treatments to improve breathing, and hydration. Given persistent wheezing with breathing, there was concern for possible lung disease that has not yet been diagnosed. 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 You should follow up with Dr Pravin Harvey at your earliest convenience for further mangement and have him consider referral to a Pulmonologist.  Secondary Diagnosis:	BPH (benign prostatic hyperplasia)  Assessment and plan of treatment:	Please continue Cardura 2mg once a day as prescribed.  Secondary Diagnosis:	Diastolic heart failure

## 2018-05-26 NOTE — PROGRESS NOTE ADULT - SUBJECTIVE AND OBJECTIVE BOX
INTERVAL HPI/OVERNIGHT EVENTS:  No overnight events, awaiting the read of the CT Chest non/contrast that was performed last night to evaluate for any lung pathology such as ILD. Otherwise, if patient is to go to [facility], he is to remain for a total of 3 nights to be eligible in terms of Medicaid.     On further ROS, patient did not have complaint of: Headache, Blurred Vision, Cough, Dyspnea, Palpitations, Abdominal Pain, N/V, Weakness, Change in Sensation.     VITAL SIGNS:  T(F): 97.5 (26 May 2018 06:14), Max: 98.4 (25 May 2018 18:46)  HR: 64 (26 May 2018 00:19) (54 - 80)  BP: 118/63 (26 May 2018 00:19) (101/75 - 142/63)  RR: 16 (26 May 2018 00:19) (16 - 16)  SpO2: 98% (26 May 2018 00:19) (94% - 98%)    Input & Output:  25 May 2018 07:01  -  26 May 2018 07:00  --------------------------------------------------------  IN: 120 mL / OUT: 1050 mL / NET: -930 mL    PHYSICAL EXAM:  Constitutional: Patient seated comfortably in bed, of appropriate color, nutrition, and hydration.   HEENT: PERRLA, Normal Range of eye movement with no complaint of diplopia, Normal Hearing  Neck: No LAD, No JVD  Back: Normal spine flexure, No CVA tenderness  Respiratory: Normal air entry, b/l rales auscultated on exam.   Cardiovascular: S1 and S2 present - no additional abnormal sounds or murmurs. Normal rhythm and rate of pulse.   Gastrointestinal: BS+, soft, NT/ND  Extremities: No peripheral edema  Vascular: 2+ peripheral pulses  Neurological: A/O x 3, CN V-XII grossly intact.  Psychiatric: Normal mood, normal affect  Musculoskeletal: 5/5 strength b/l upper and lower extremities  Skin: No rashes    MEDICATIONS  (STANDING):  apixaban 2.5 milliGRAM(s) Oral every 12 hours  aspirin enteric coated 81 milliGRAM(s) Oral daily  doxazosin 2 milliGRAM(s) Oral at bedtime  furosemide    Tablet 20 milliGRAM(s) Oral two times a day  potassium chloride   Powder 20 milliEquivalent(s) Oral once      Allergies    No Known Allergies    Intolerances      LABS:                        12.4   5.7   )-----------( 132      ( 25 May 2018 07:48 )             37.9       137  |  97  |  26<H>  ----------------------------<  107<H>  3.5   |  29  |  1.19    Ca    8.9      25 May 2018 07:48  Mg     1.8     05-25    PTT - ( 25 May 2018 07:48 )  PTT:82.2 sec      RADIOLOGY & ADDITIONAL TESTS:  Pending non/con CT Chest

## 2018-05-26 NOTE — PROGRESS NOTE ADULT - PROBLEM SELECTOR PLAN 5
Patient with platelet count of 125; unclear etiology   - HIV/Hepatitis Panel Normal   - U/S Abdomen - No focal pathology in liver and spleen

## 2018-05-26 NOTE — DISCHARGE NOTE ADULT - PROVIDER TOKENS
FREE:[LAST:[MD Candice],FIRST:[Pravin],PHONE:[phone]],FAX:[(   )    -],ADDRESS:[[address] [place] [id number]]]

## 2018-05-26 NOTE — PROGRESS NOTE ADULT - PROBLEM SELECTOR PLAN 1
- RVP (+)   - C/w Supportive Care   - Lung exam still reveals rales, worry raised about possible ILD  - To f/u non/con CT Chest read

## 2018-05-27 PROCEDURE — 99233 SBSQ HOSP IP/OBS HIGH 50: CPT

## 2018-05-27 RX ADMIN — Medication 2 MILLIGRAM(S): at 22:13

## 2018-05-27 RX ADMIN — APIXABAN 2.5 MILLIGRAM(S): 2.5 TABLET, FILM COATED ORAL at 06:03

## 2018-05-27 RX ADMIN — Medication 81 MILLIGRAM(S): at 08:54

## 2018-05-27 RX ADMIN — APIXABAN 2.5 MILLIGRAM(S): 2.5 TABLET, FILM COATED ORAL at 17:34

## 2018-05-27 RX ADMIN — Medication 20 MILLIGRAM(S): at 06:03

## 2018-05-27 RX ADMIN — Medication 20 MILLIGRAM(S): at 17:34

## 2018-05-27 NOTE — PROGRESS NOTE ADULT - PROBLEM SELECTOR PLAN 7
F: no fluids needed  E: Replete electrolytes PRN  N: DASH with water restriction diet    # Dispo: PT recommending [facility]  Prophylactic measure: Eliquis 2.5mg BID  FULL CODE

## 2018-05-27 NOTE — PROGRESS NOTE ADULT - SUBJECTIVE AND OBJECTIVE BOX
CARDIOLOGY RESIDENT PROGRESS NOTE    Subjective:   HR in mid 30s while patient sleeping overnight but no pauses observed on tele. Patient seen and examined at bedside. Has no acute complaints. 	    Vitals:  T(C): 36.6 (05-27-18 @ 10:36), Max: 37.1 (05-26-18 @ 18:41)  HR: 70 (05-27-18 @ 13:19) (50 - 70)  BP: 114/50 (05-27-18 @ 13:19) (110/57 - 133/48)  RR: 16 (05-27-18 @ 13:19) (16 - 16)  SpO2: 95% (05-27-18 @ 13:19) (95% - 98%)  Wt(kg): --  I&O's Summary    26 May 2018 07:01  -  27 May 2018 07:00  --------------------------------------------------------  IN: 860 mL / OUT: 1550 mL / NET: -690 mL    27 May 2018 07:01  -  27 May 2018 13:31  --------------------------------------------------------  IN: 90 mL / OUT: 0 mL / NET: 90 mL          PHYSICAL EXAM:  Appearance: Normal, lying in bed comfortably	  HEENT:   Normal oral mucosa, PERRL, EOMI	  Lymphatic: No lymphadenopathy  Cardiovascular: Normal S1 S2, No JVD, No murmurs  Respiratory: + rales, mild bibasilar crackles 	  Psychiatry: A & O x 3, Mood & affect appropriate  Gastrointestinal:  Soft, Non-tender, + BS	  Skin: No rashes, No ecchymoses, No cyanosis  Neurologic: Non-focal  Extremities: Normal range of motion, No cyanosis, trace b/l edema  Vascular: Peripheral pulses palpable 2+ bilaterally    TELEMETRY: 	    ECG:  	      DIAGNOSTIC TESTING:  [ ] Echocardiogram:  [ ]  Catheterization:  [ ] Stress Test:    OTHER: 	      CURRENT MEDICATIONS:  doxazosin 2 milliGRAM(s) Oral at bedtime  furosemide    Tablet 20 milliGRAM(s) Oral two times a day              apixaban 2.5 milliGRAM(s) Oral every 12 hours      LABS:	 	    CARDIAC MARKERS:                    proBNP:   Lipid Profile:   HgA1c:   TSH:

## 2018-05-27 NOTE — PROGRESS NOTE ADULT - ATTENDING COMMENTS
Covering for Dr. Tejeda. Pt remains in persistent AF w/ slow ventricular response, no notable pauses. Asymptomatic. Medically cleared awaiting placement to [facility]. No labs required.

## 2018-05-27 NOTE — PROGRESS NOTE ADULT - PROBLEM SELECTOR PLAN 2
Patient with atrial fibrillation, slow ventricular response   - Per Collateral from PCP, this is new onset   - CHADSVASC 4   - Transitioned to Eliquis 2.5mg BID from Heparin Drip   - No plan for Cardioversion at this time per EP

## 2018-05-27 NOTE — PROGRESS NOTE ADULT - PROBLEM SELECTOR PLAN 1
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

## 2018-05-28 VITALS — TEMPERATURE: 97 F

## 2018-05-28 PROCEDURE — 85027 COMPLETE CBC AUTOMATED: CPT

## 2018-05-28 PROCEDURE — 99285 EMERGENCY DEPT VISIT HI MDM: CPT | Mod: 25

## 2018-05-28 PROCEDURE — 84439 ASSAY OF FREE THYROXINE: CPT

## 2018-05-28 PROCEDURE — 84480 ASSAY TRIIODOTHYRONINE (T3): CPT

## 2018-05-28 PROCEDURE — 86901 BLOOD TYPING SEROLOGIC RH(D): CPT

## 2018-05-28 PROCEDURE — 83930 ASSAY OF BLOOD OSMOLALITY: CPT

## 2018-05-28 PROCEDURE — 87633 RESP VIRUS 12-25 TARGETS: CPT

## 2018-05-28 PROCEDURE — 82550 ASSAY OF CK (CPK): CPT

## 2018-05-28 PROCEDURE — 84540 ASSAY OF URINE/UREA-N: CPT

## 2018-05-28 PROCEDURE — 83735 ASSAY OF MAGNESIUM: CPT

## 2018-05-28 PROCEDURE — 83605 ASSAY OF LACTIC ACID: CPT

## 2018-05-28 PROCEDURE — 87581 M.PNEUMON DNA AMP PROBE: CPT

## 2018-05-28 PROCEDURE — 83690 ASSAY OF LIPASE: CPT

## 2018-05-28 PROCEDURE — 80061 LIPID PANEL: CPT

## 2018-05-28 PROCEDURE — 93005 ELECTROCARDIOGRAM TRACING: CPT

## 2018-05-28 PROCEDURE — 71250 CT THORAX DX C-: CPT

## 2018-05-28 PROCEDURE — 87389 HIV-1 AG W/HIV-1&-2 AB AG IA: CPT

## 2018-05-28 PROCEDURE — 87798 DETECT AGENT NOS DNA AMP: CPT

## 2018-05-28 PROCEDURE — 80053 COMPREHEN METABOLIC PANEL: CPT

## 2018-05-28 PROCEDURE — 80074 ACUTE HEPATITIS PANEL: CPT

## 2018-05-28 PROCEDURE — 84300 ASSAY OF URINE SODIUM: CPT

## 2018-05-28 PROCEDURE — 99239 HOSP IP/OBS DSCHRG MGMT >30: CPT

## 2018-05-28 PROCEDURE — 84484 ASSAY OF TROPONIN QUANT: CPT

## 2018-05-28 PROCEDURE — 82570 ASSAY OF URINE CREATININE: CPT

## 2018-05-28 PROCEDURE — 70450 CT HEAD/BRAIN W/O DYE: CPT

## 2018-05-28 PROCEDURE — 86850 RBC ANTIBODY SCREEN: CPT

## 2018-05-28 PROCEDURE — 36415 COLL VENOUS BLD VENIPUNCTURE: CPT

## 2018-05-28 PROCEDURE — 76856 US EXAM PELVIC COMPLETE: CPT

## 2018-05-28 PROCEDURE — 87486 CHLMYD PNEUM DNA AMP PROBE: CPT

## 2018-05-28 PROCEDURE — 85610 PROTHROMBIN TIME: CPT

## 2018-05-28 PROCEDURE — 97161 PT EVAL LOW COMPLEX 20 MIN: CPT

## 2018-05-28 PROCEDURE — 85730 THROMBOPLASTIN TIME PARTIAL: CPT

## 2018-05-28 PROCEDURE — 83935 ASSAY OF URINE OSMOLALITY: CPT

## 2018-05-28 PROCEDURE — 81001 URINALYSIS AUTO W/SCOPE: CPT

## 2018-05-28 PROCEDURE — 72170 X-RAY EXAM OF PELVIS: CPT

## 2018-05-28 PROCEDURE — 83036 HEMOGLOBIN GLYCOSYLATED A1C: CPT

## 2018-05-28 PROCEDURE — 83880 ASSAY OF NATRIURETIC PEPTIDE: CPT

## 2018-05-28 PROCEDURE — 82553 CREATINE MB FRACTION: CPT

## 2018-05-28 PROCEDURE — 80307 DRUG TEST PRSMV CHEM ANLYZR: CPT

## 2018-05-28 PROCEDURE — 76700 US EXAM ABDOM COMPLETE: CPT

## 2018-05-28 PROCEDURE — 71045 X-RAY EXAM CHEST 1 VIEW: CPT

## 2018-05-28 PROCEDURE — 85025 COMPLETE CBC W/AUTO DIFF WBC: CPT

## 2018-05-28 PROCEDURE — 80048 BASIC METABOLIC PNL TOTAL CA: CPT

## 2018-05-28 PROCEDURE — 84443 ASSAY THYROID STIM HORMONE: CPT

## 2018-05-28 PROCEDURE — 87086 URINE CULTURE/COLONY COUNT: CPT

## 2018-05-28 PROCEDURE — 86900 BLOOD TYPING SEROLOGIC ABO: CPT

## 2018-05-28 PROCEDURE — 97116 GAIT TRAINING THERAPY: CPT

## 2018-05-28 PROCEDURE — 93306 TTE W/DOPPLER COMPLETE: CPT

## 2018-05-28 RX ADMIN — APIXABAN 2.5 MILLIGRAM(S): 2.5 TABLET, FILM COATED ORAL at 05:39

## 2018-05-28 RX ADMIN — Medication 20 MILLIGRAM(S): at 05:39

## 2018-05-28 NOTE — PROGRESS NOTE ADULT - PROBLEM SELECTOR PLAN 7
F: no fluids needed  E: Replete electrolytes PRN  N: DASH with fluid restriction  Prophylactic measure: Eliquis 2.5mg BID  FULL CODE

## 2018-05-28 NOTE — PROGRESS NOTE ADULT - SUBJECTIVE AND OBJECTIVE BOX
INTERVAL HPI/OVERNIGHT EVENTS:  No overnight events. On Tele, patient continues to be in slow atrial fibrillation, with HR b/w 50-60s.   CT Chest finally read, c/w ILD - instructions in D/C paperwork for PCP to consider Pulmonology referral.   Otherwise, for [facility].     On further ROS, patient did not have complaint of: Headache, Blurred Vision, Cough, Dyspnea, Palpitations, Abdominal Pain, N/V, Weakness, Change in Sensation.     VITAL SIGNS:  T(F): 98.6 (28 May 2018 05:25), Max: 98.6 (28 May 2018 05:25)  HR: 58 (28 May 2018 09:14) (52 - 70)  BP: 137/61 (28 May 2018 09:14) (107/52 - 139/53)  BP(mean): 96 (28 May 2018 09:14) (77 - 96)  RR: 15 (28 May 2018 09:14) (15 - 16)  SpO2: 96% (28 May 2018 09:14) (95% - 97%)    Input & Output:  27 May 2018 07:01  -  28 May 2018 07:00  --------------------------------------------------------  IN: 327 mL / OUT: 580 mL / NET: -253 mL    28 May 2018 07:01  -  28 May 2018 11:15  --------------------------------------------------------  IN: 180 mL / OUT: 600 mL / NET: -420 mL      PHYSICAL EXAM:  Constitutional: Patient seated comfortably in bed, of appropriate color, nutrition, and hydration.   HEENT: PERRLA, Normal Range of eye movement with no complaint of diplopia, Normal Hearing  Neck: No LAD, No JVD  Back: Normal spine flexure, No CVA tenderness  Respiratory: Normal air entry, b/l rales auscultated on exam.   Cardiovascular: S1 and S2 present - no additional abnormal sounds or murmurs. Normal rhythm and rate of pulse.   Gastrointestinal: BS+, soft, NT/ND  Extremities: No peripheral edema  Vascular: 2+ peripheral pulses  Neurological: A/O x 3, CN V-XII grossly intact.  Psychiatric: Normal mood, normal affect  Musculoskeletal: 5/5 strength b/l upper and lower extremities  Skin: No rashes    MEDICATIONS  (STANDING):  apixaban 2.5 milliGRAM(s) Oral every 12 hours  aspirin enteric coated 81 milliGRAM(s) Oral daily  doxazosin 2 milliGRAM(s) Oral at bedtime  furosemide    Tablet 20 milliGRAM(s) Oral two times a day  potassium chloride   Powder 20 milliEquivalent(s) Oral once      Allergies    No Known Allergies    Intolerances      LABS: No new labs.                RADIOLOGY & ADDITIONAL TESTS:  < from: CT Chest No Cont (05.25.18 @ 19:20) >  FINDINGS:  Lungs: Unremarkable.  Pleural space: Extensive scattered pulmonary fibrosis, honeycombing,   bronchial wall thickening, and  chronic interstitial changes. No discrete focal consolidation. No pleural   effusion. No pneumothorax.  Heart: Unremarkable.  Bones/joints: Unremarkable. No acute fracture. No dislocation.  Soft tissues: Unremarkable.  Vasculature: Unremarkable. No thoracic aortic aneurysm.  Lymph nodes: Unremarkable.

## 2018-05-28 NOTE — PROGRESS NOTE ADULT - PROBLEM SELECTOR PLAN 1
- RVP (+)   - C/w Supportive Care   - Lung exam still reveals rales, worry raised about possible ILD  - CT Chest c/w ILD - instructions in D/C paperwork for PCP to consider Pulm referral

## 2018-05-31 DIAGNOSIS — N40.0 BENIGN PROSTATIC HYPERPLASIA WITHOUT LOWER URINARY TRACT SYMPTOMS: ICD-10-CM

## 2018-05-31 DIAGNOSIS — D69.6 THROMBOCYTOPENIA, UNSPECIFIED: ICD-10-CM

## 2018-05-31 DIAGNOSIS — E87.1 HYPO-OSMOLALITY AND HYPONATREMIA: ICD-10-CM

## 2018-05-31 DIAGNOSIS — I11.0 HYPERTENSIVE HEART DISEASE WITH HEART FAILURE: ICD-10-CM

## 2018-05-31 DIAGNOSIS — I48.91 UNSPECIFIED ATRIAL FIBRILLATION: ICD-10-CM

## 2018-05-31 DIAGNOSIS — I50.32 CHRONIC DIASTOLIC (CONGESTIVE) HEART FAILURE: ICD-10-CM

## 2018-05-31 DIAGNOSIS — B34.8 OTHER VIRAL INFECTIONS OF UNSPECIFIED SITE: ICD-10-CM

## 2019-08-11 NOTE — ED PROVIDER NOTE - CPE EDP RESP NORM
"Pt reports having chest pain at rest yesterday while playing video games. Pt states that chest pain is midsternal and describes the pain as "pressure". Pt reports that since the chest pain started yesterday evening it hasn't subsided, but has gotten milder intermittently. Pt denies SOB, n/v with CP. Pt does endorse hx of anxiety.   "
KERI Gil @ bedside.   
Patient placed on continuous cardiac monitor Box # [id number]  
- - -

## 2020-03-11 NOTE — PATIENT PROFILE ADULT. - ALCOHOL USE HISTORY SINGLE SELECT
Dr. Fry following for elevated TSH  - Started Levothyroxine 25mcg  - Check TFT in 6 weeks.
yes
never

## 2020-09-23 NOTE — DISCHARGE NOTE ADULT - NS MD DC PLAN IMMU FLU REF OTH
Principal Discharge DX:	Screening examination for infectious disease   Principal Discharge DX:	Viral illness   Out of season

## 2021-02-19 NOTE — PATIENT PROFILE ADULT. - FUNCTIONAL SCREEN CURRENT LEVEL: SWALLOWING (IF SCORE 2 OR MORE FOR ANY ITEM, CONSULT REHAB SERVICES), MLM)
Patient given his upper and lower dentures and placed them in his mouth (0) swallows foods/liquids without difficulty

## 2024-07-12 NOTE — ED PROVIDER NOTE - EYES NEGATIVE STATEMENT, MLM
no discharge, no irritation, no pain, no redness, and no visual changes. What Is The Reason For Today's Visit?: Follow Up Non-Melanoma Skin Cancer How Many Skin Cancers Have You Had?: one Additional History: Past history of BCC R posterior shoulder. No lesions of concern today.